# Patient Record
Sex: FEMALE | Race: WHITE | NOT HISPANIC OR LATINO | URBAN - METROPOLITAN AREA
[De-identification: names, ages, dates, MRNs, and addresses within clinical notes are randomized per-mention and may not be internally consistent; named-entity substitution may affect disease eponyms.]

---

## 2019-01-01 ENCOUNTER — EMERGENCY (EMERGENCY)
Facility: HOSPITAL | Age: 34
LOS: 0 days | End: 2019-12-04
Attending: EMERGENCY MEDICINE | Admitting: EMERGENCY MEDICINE
Payer: SELF-PAY

## 2019-01-01 VITALS — TEMPERATURE: 97 F

## 2019-01-01 DIAGNOSIS — I46.9 CARDIAC ARREST, CAUSE UNSPECIFIED: ICD-10-CM

## 2019-01-01 DIAGNOSIS — R19.7 DIARRHEA, UNSPECIFIED: ICD-10-CM

## 2019-01-01 DIAGNOSIS — R11.10 VOMITING, UNSPECIFIED: ICD-10-CM

## 2019-01-01 PROCEDURE — 99292 CRITICAL CARE ADDL 30 MIN: CPT | Mod: 25

## 2019-01-01 PROCEDURE — 31500 INSERT EMERGENCY AIRWAY: CPT

## 2019-01-01 PROCEDURE — 99291 CRITICAL CARE FIRST HOUR: CPT | Mod: 25

## 2019-01-01 PROCEDURE — 36556 INSERT NON-TUNNEL CV CATH: CPT

## 2019-12-04 NOTE — ED PROVIDER NOTE - PHYSICAL EXAMINATION
VITAL SIGNS: I have reviewed nursing notes and confirm.  CONSTITUTIONAL: Well-developed; well-nourished; in severe distress  SKIN: Skin exam is cool.   HEAD: Normocephalic; atraumatic.  EYES: Fixed and dilated  ENT: Dima tube in place, small amount of blood around tube.   CARD:  No heart sounds.   RESP: BS B/L.  NEURO: GCS 3.

## 2019-12-04 NOTE — ED PROVIDER NOTE - OBJECTIVE STATEMENT
33 yo F with PMHx of hypothyroidism s/p thyroidectomy and adrenal resection on cortisone presents to the ED BIB EMS in cardiac arrest. According to EMS pt had vomiting/diarrhea x 2 days and tonight she woke up stating she didn't feel well and collapsed. EMS arrived and pt was initially breathing but then lost her pulse with an initial rhythm of asystole. ACLS initiated, pt intubated with david tube, pt was given dextrose for glucose of 22 and epi x 5 with ROSC after 20 minutes. On ED arrival pt in asystole, ACLS resumed.

## 2019-12-04 NOTE — ED PROVIDER NOTE - UNABLE TO OBTAIN
Ortho Follow Up    Subjective:  Sitting up in bed. Pleasantly confused this morning and doesn't remember breaking her femur. Pain controlled. Reports feeling tired, but denies dizziness or palpitations. No new issues.    Objective:  Vitals:  Temp:  [98 °F (36.7 °C)-100.4 °F (38 °C)] 99.4 °F (37.4 °C)  Pulse:  [60-77] 61  Resp:  [12-18] 16  BP: ()/(41-59) 92/44  FiO2 (%):  [21.8 %-99.8 %] 99 %  Gen:  A&Ox1.  NAD.  Ext:  dressing intact and dry.  Moves toes and ankle.  +SILT in SP/DP/T.  Extremity well-perfused.  No neuro changes.  No evidence of DVT.    Labs:  HGB (g/dL)   Date Value   05/04/2019 6.6 (LL)     HCT (%)   Date Value   05/04/2019 21.2 (L)     INR (no units)   Date Value   05/02/2019 1.0       Assessment/Plan:  POD#1 s/p right hip cephalomedullary fixation.  Doing well.  · Pain controlled- Tylenol  · DVT prophylaxis: Heparin  · Decreased H&H- patient received 1 unit PRBC intraop, 1 unit PRBC ordered this AM by hospitalist  · Change dressing today- Silverlon  · Physical therapy to mobilize as able.  · Operative hip WBAT  · Disposition:  [facility] when medically stable  · Follow up in clinic 2 weeks after surgery    Elle Almonte PA-C    Date: 5/4/2019 Time: 7:37 AM         Severe Illness/Injury cardiac arrest

## 2019-12-04 NOTE — ED PROVIDER NOTE - CLINICAL SUMMARY MEDICAL DECISION MAKING FREE TEXT BOX
34yF hx hypothyroid, adrenal insufficiency presents after 2 days of vomiting and diarrhea in cardiac arrest - acsl x 1hr , 16 minutes. no return of pulse - familyat  bedside -  ME called - Investigator anthony.

## 2019-12-04 NOTE — ED ADULT NURSE NOTE - NSIMPLEMENTINTERV_GEN_ALL_ED
Implemented All Fall Risk Interventions:  [place] to call system. Call bell, personal items and telephone within reach. Instruct patient to call for assistance. Room bathroom lighting operational. Non-slip footwear when patient is off stretcher. Physically safe environment: no spills, clutter or unnecessary equipment. Stretcher in lowest position, wheels locked, appropriate side rails in place. Provide visual cue, wrist band, yellow gown, etc. Monitor gait and stability. Monitor for mental status changes and reorient to person, place, and time. Review medications for side effects contributing to fall risk. Reinforce activity limits and safety measures with patient and family.

## 2019-12-04 NOTE — ED PROVIDER NOTE - ATTENDING CONTRIBUTION TO CARE
I personally evaluated the patient. I reviewed the Resident’s or Physician Assistant’s note (as assigned above), and agree with the findings and plan except as documented in my note.  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   Per ems and family -  + sick contacts in house  kids  and  with vomiting diarrhea  - pt  has  vomiting and diarrhea  x 2 days -   tonight  walked to   saying  she doesn't feel well,  and collapsed on the floor -  Per EMS on arrival pt was initally breathing,  then lost  pulse - initial rhythm asystole. ACLS started - PT intubated with david tube,  epi x 5  given  ,  FS was 23 - dextrose  given -   Pt  regained  pulse after  20 minutes -  on ED arrival  pt asystole,  ACLS  continued.  Pt I personally evaluated the patient. I reviewed the Resident’s or Physician Assistant’s note (as assigned above), and agree with the findings and plan except as documented in my note.  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   Per ems and family ,  + sick contacts in house  kids  and  with vomiting diarrhea  - pt  has  vomiting and diarrhea  x 2 days -   tonight  walked to   saying  she doesn't feel well,  and collapsed on the floor -  Per EMS on arrival pt was initially breathing,  then lost  pulse - initial rhythm asystole. ACLS started - PT intubated with dima tube,  epi x 5  given  ,  FS was 23 - dextrose  given -   Pt  regained  pulse after  20 minutes -  on ED arrival  pt asystole,  ACLS  continued.     aside from  acls meds , pt  received thyroxine 100 mcg and  solucortef 100mg.  bedside US  RV not dilated, no tamponade.  Dima tube changed to  formal ETT with bl BS and equal chest rise.   Pt  briefly    had  return of pulses x 2, Jose EKG reviewed QTc 452.  PT also  had  1 rhythm of Vtach -  Defibrillated.  No return of pulse.  Family  up dated throughout code.   During code pt was suctioned  for  frothy bloody sputum.  Pt  developed  decreased BS left sided  -  ETT  pulled  back  to 23 ,   and suctioned o2 97%.  Pt  had no return of pulses after 1hour and 16 minutes. Father at bedside at time of death.   Bedside US no cardiac motion ,  asystole on monitor,  No pulse palpated.      TIme of death 02:23